# Patient Record
Sex: MALE | Race: WHITE | Employment: FULL TIME
[De-identification: names, ages, dates, MRNs, and addresses within clinical notes are randomized per-mention and may not be internally consistent; named-entity substitution may affect disease eponyms.]

---

## 2017-03-08 ENCOUNTER — OFFICE VISIT (OUTPATIENT)
Dept: NEUROLOGY | Facility: CLINIC | Age: 32
End: 2017-03-08

## 2017-03-08 VITALS
WEIGHT: 182.2 LBS | DIASTOLIC BLOOD PRESSURE: 74 MMHG | BODY MASS INDEX: 24.15 KG/M2 | SYSTOLIC BLOOD PRESSURE: 116 MMHG | HEIGHT: 73 IN | HEART RATE: 72 BPM

## 2017-03-08 DIAGNOSIS — I63.81 THALAMIC INFARCTION (HCC): Primary | ICD-10-CM

## 2017-03-08 PROCEDURE — 99213 OFFICE O/P EST LOW 20 MIN: CPT | Performed by: PSYCHIATRY & NEUROLOGY

## 2017-12-21 ENCOUNTER — OFFICE VISIT (OUTPATIENT)
Dept: NEUROLOGY | Age: 32
End: 2017-12-21
Payer: COMMERCIAL

## 2017-12-21 ENCOUNTER — TELEPHONE (OUTPATIENT)
Dept: NEUROLOGY | Age: 32
End: 2017-12-21

## 2017-12-21 VITALS
BODY MASS INDEX: 24.78 KG/M2 | DIASTOLIC BLOOD PRESSURE: 77 MMHG | SYSTOLIC BLOOD PRESSURE: 126 MMHG | WEIGHT: 187 LBS | HEART RATE: 80 BPM | HEIGHT: 73 IN

## 2017-12-21 DIAGNOSIS — I63.81 THALAMIC INFARCTION (HCC): Primary | ICD-10-CM

## 2017-12-21 PROCEDURE — 99213 OFFICE O/P EST LOW 20 MIN: CPT | Performed by: PSYCHIATRY & NEUROLOGY

## 2017-12-21 NOTE — LETTER
disorder despite the past thalamic infarction. REVIEW OF SYSTEMS    CONSTITUTIONAL Weight: absent, Appetite: absent, Fatigue: absent      HEENT Ears: normal, Eyes: normal, Visual disturbance: absent   RESPIRATORY Shortness of breath: absent, Cough: absent   CARDIOVASCULAR Chest pain: absent, Leg swelling: absent      GI Constipation: absent, Diarrhea: absent, Swallowing change: absent       Urinary frequency: absent, Urinary urgency: absent, Urinary incontinence: absent   MUSCULOSKELETAL Neck pain: absent, Back pain: absent, Stiffness: present, Muscle pain: absent, Joint pain: absent, Restless legs: present   DERMATOLOGIC Hair loss: absent, Skin changes: absent   NEUROLOGIC Memory loss: absent, Confusion: absent, Seizures: absent Trouble walking or imbalance: present, Dizziness: absent, Weakness: present, Numbness: present, Tremor: absent, Spasm: present, Speech difficulty: absent, Headache: present, Light sensitivity: absent   PSYCHIATRIC Anxiety: present, Hallucination: absent, Mood disorder: present   HEMATOLOGIC Abnormal bleeding: absent, Anemia: absent, Clotting disorder: absent, Lymph gland changes: absent                   Outpatient Prescriptions Marked as Taking for the 12/21/17 encounter (Office Visit) with Chilango Heller MD   Medication Sig Dispense Refill    aspirin 81 MG tablet Take 81 mg by mouth daily                                            PHYSICAL EXAMINATION                                              .                                                                                                     General Appearance:  Alert, cooperative, no signs of distress, appears stated age, well groomed and dressed, fit for age   Head:  Normocephalic, no signs of trauma   Eyes:  Conjunctiva/corneas clear; eyelids intact   Ears:  Normal external ear and canals   Nose: Nares normal, mucosa normal    Throat: Lips and tongue normal; teeth normal; gums normal Neck: Supple neck with intact flexion, extension and rotation; no muscle spasm;  trachea midline; no adenopathy; thyroid: not enlarged; no carotid pulse abnormality   Back:   Symmetric, no curvature, ROM adequate   Lungs:   Respirations unlabored   Chest Wall:  No deformity   Heart:  Regular rate and rhythm   Abdomen:   No masses   Extremities: Extremities normal, no cyanosis, no edema   Pulses: Symmetric over head and neck   Skin: Skin color, texture normal, no rashes, no lesions   Lymph nodes: Cervical, supraclavicular nodes normal                                         NEUROLOGIC EXAMINATION    MENTAL STATUS: Alert, oriented, intact memory, no confusion, normal speech, normal language, no hallucination or delusion   CRANIAL NERVES: II     -      Visual fields intact to confrontation  III,IV,VI -  EOMs full, no afferent defect, no                      HIRA, no ptosis  V     -     Normal facial sensation  VII    -     Normal facial symmetry  VIII   -     Intact hearing  IX,X -     Symmetrical palate  XI    -     Symmetrical shoulder shrug  XII   -     Midline tongue, no atrophy    MOTOR FUNCTION: Normal bulk, normal tone, normal power;  no involuntary movements, no tremor   SENSORY FUNCTION: Normal touch, vibration and pin aside from left medial 3 fingertips and toes   CEREBELLAR FUNCTION: Intact fine motor control over upper limbs   REFLEX FUNCTION: Asymmetric with left side preponderance   STATION and GAIT Normal station, normal gait including tandem but slight left hand posturing and unsteadiness with reverse tandem on heels               ASSESSMENT and  PLAN:      In summary, your patient, Wolf Khan appears about the same. He has no new lateralizing or localizing neurologic deficits but subtle residual features of his past right thalamic injury with left-sided hyperreflexia and a subtle primary sensory disturbance over the left fingers and toes. There is no need for additional neurologic investigation or a change in therapy. His treatment plan is appropriate. I will remain available for questions or any other issues that may arise and he will return for reevaluation on an annual basis, as long as he remains stable. If you have questions, please do not hesitate to call me. I look forward to following Mellissa Richardson along with you.     Sincerely,        Michael Bennett MD

## 2017-12-21 NOTE — COMMUNICATION BODY
HPI:        Your patient, Alireza Jerome returns for continuing neurologic management of his past right thalamic infarction. He still follows with a cardiologist who has been monitoring his condition and considers the pulmonic regurgitation a trivial problem but has a loop recorder in him and is monitoring heart rate for potential cardiac irregularities that may be the cause of his stroke injury. Plans are for continued monitoring up to 3 years. August 2017 was the 2 year ji with this device. He still has numbness in the left foot and hand and minimal left foot weakness which seems more apparent when first up in the morning, when exercising or fatigued at the end of the day. Also while his guitar playing has improved, he still notices that he can't keep time as well with the left hand and the left fourth and fifth fingers are not as sensitive as they once were. As you recall, laboratories were normal aside from a homocysteine which was borderline at 16.7 (NL 5.9-16) and protein S which was low at 37 (NL ). Hematology evaluation found no greater need for anticoagulation other than the aspirin antiplatelet therapy. He also is being treated with B12 oral supplementation for the elevated borderline homocysteine due to his vegetarian diet and lack of B12. There have been no new medical or surgical issues nor any recent trauma, infection/fever or intoxication to complicate matters and no recurrent symptoms of ischemic cerebrovascular disease. Notably he has had no pain disorder despite the past thalamic infarction.                                       REVIEW OF SYSTEMS    CONSTITUTIONAL Weight: absent, Appetite: absent, Fatigue: absent      HEENT Ears: normal, Eyes: normal, Visual disturbance: absent   RESPIRATORY Shortness of breath: absent, Cough: absent   CARDIOVASCULAR Chest pain: absent, Leg swelling: absent      GI Constipation: absent, Diarrhea: absent, Swallowing change: absent       Urinary frequency: absent, Urinary urgency: absent, Urinary incontinence: absent   MUSCULOSKELETAL Neck pain: absent, Back pain: absent, Stiffness: present, Muscle pain: absent, Joint pain: absent, Restless legs: present   DERMATOLOGIC Hair loss: absent, Skin changes: absent   NEUROLOGIC Memory loss: absent, Confusion: absent, Seizures: absent Trouble walking or imbalance: present, Dizziness: absent, Weakness: present, Numbness: present, Tremor: absent, Spasm: present, Speech difficulty: absent, Headache: present, Light sensitivity: absent   PSYCHIATRIC Anxiety: present, Hallucination: absent, Mood disorder: present   HEMATOLOGIC Abnormal bleeding: absent, Anemia: absent, Clotting disorder: absent, Lymph gland changes: absent                   Outpatient Prescriptions Marked as Taking for the 12/21/17 encounter (Office Visit) with Scott Perales MD   Medication Sig Dispense Refill    aspirin 81 MG tablet Take 81 mg by mouth daily                                            PHYSICAL EXAMINATION                                              .                                                                                                     General Appearance:  Alert, cooperative, no signs of distress, appears stated age, well groomed and dressed, fit for age   Head:  Normocephalic, no signs of trauma   Eyes:  Conjunctiva/corneas clear; eyelids intact   Ears:  Normal external ear and canals   Nose: Nares normal, mucosa normal    Throat: Lips and tongue normal; teeth normal; gums normal   Neck: Supple neck with intact flexion, extension and rotation; no muscle spasm;  trachea midline; no adenopathy; thyroid: not enlarged; no carotid pulse abnormality   Back:   Symmetric, no curvature, ROM adequate   Lungs:   Respirations unlabored   Chest Wall:  No deformity   Heart:  Regular rate and rhythm   Abdomen:   No masses   Extremities: Extremities normal, no cyanosis, no edema   Pulses: Symmetric over head and neck   Skin: Skin color, texture normal, no rashes, no lesions   Lymph nodes: Cervical, supraclavicular nodes normal                                         NEUROLOGIC EXAMINATION    MENTAL STATUS: Alert, oriented, intact memory, no confusion, normal speech, normal language, no hallucination or delusion   CRANIAL NERVES: II     -      Visual fields intact to confrontation  III,IV,VI -  EOMs full, no afferent defect, no                      HIRA, no ptosis  V     -     Normal facial sensation  VII    -     Normal facial symmetry  VIII   -     Intact hearing  IX,X -     Symmetrical palate  XI    -     Symmetrical shoulder shrug  XII   -     Midline tongue, no atrophy    MOTOR FUNCTION: Normal bulk, normal tone, normal power;  no involuntary movements, no tremor   SENSORY FUNCTION: Normal touch, vibration and pin aside from left medial 3 fingertips and toes   CEREBELLAR FUNCTION: Intact fine motor control over upper limbs   REFLEX FUNCTION: Asymmetric with left side preponderance   STATION and GAIT Normal station, normal gait including tandem but slight left hand posturing and unsteadiness with reverse tandem on heels               ASSESSMENT and  PLAN:      In summary, your patient, Rubi Khan appears about the same. He has no new lateralizing or localizing neurologic deficits but subtle residual features of his past right thalamic injury with left-sided hyperreflexia and a subtle primary sensory disturbance over the left fingers and toes. There is no need for additional neurologic investigation or a change in therapy. His treatment plan is appropriate. I will remain available for questions or any other issues that may arise and he will return for reevaluation on an annual basis, as long as he remains stable.

## 2018-01-18 ENCOUNTER — OFFICE VISIT (OUTPATIENT)
Dept: INTERNAL MEDICINE | Age: 33
End: 2018-01-18
Payer: COMMERCIAL

## 2018-01-18 ENCOUNTER — HOSPITAL ENCOUNTER (OUTPATIENT)
Age: 33
Discharge: HOME | End: 2018-01-18
Payer: COMMERCIAL

## 2018-01-18 VITALS
DIASTOLIC BLOOD PRESSURE: 75 MMHG | WEIGHT: 186 LBS | HEART RATE: 80 BPM | HEIGHT: 73 IN | OXYGEN SATURATION: 98 % | SYSTOLIC BLOOD PRESSURE: 131 MMHG | BODY MASS INDEX: 24.65 KG/M2

## 2018-01-18 DIAGNOSIS — Z00.00 WELL ADULT EXAM: ICD-10-CM

## 2018-01-18 DIAGNOSIS — Z00.00 WELL ADULT EXAM: Primary | ICD-10-CM

## 2018-01-18 LAB
ALBUMIN SERPL-MCNC: 4.9 G/DL (ref 3.5–5.2)
ALBUMIN/GLOBULIN RATIO: 2 (ref 1–2.5)
ALP BLD-CCNC: 66 U/L (ref 40–129)
ALT SERPL-CCNC: 25 U/L (ref 5–41)
ANION GAP SERPL CALCULATED.3IONS-SCNC: 12 MMOL/L (ref 9–17)
AST SERPL-CCNC: 23 U/L
BILIRUB SERPL-MCNC: 1.8 MG/DL (ref 0.3–1.2)
BUN BLDV-MCNC: 9 MG/DL (ref 6–20)
BUN/CREAT BLD: ABNORMAL (ref 9–20)
CALCIUM SERPL-MCNC: 9.9 MG/DL (ref 8.6–10.4)
CHLORIDE BLD-SCNC: 103 MMOL/L (ref 98–107)
CHOLESTEROL/HDL RATIO: 2.3
CHOLESTEROL: 158 MG/DL
CO2: 26 MMOL/L (ref 20–31)
CREAT SERPL-MCNC: 0.85 MG/DL (ref 0.7–1.2)
GFR AFRICAN AMERICAN: >60 ML/MIN
GFR NON-AFRICAN AMERICAN: >60 ML/MIN
GFR SERPL CREATININE-BSD FRML MDRD: ABNORMAL ML/MIN/{1.73_M2}
GFR SERPL CREATININE-BSD FRML MDRD: ABNORMAL ML/MIN/{1.73_M2}
GLUCOSE BLD-MCNC: 87 MG/DL (ref 70–99)
HDLC SERPL-MCNC: 68 MG/DL
LDL CHOLESTEROL: 73 MG/DL (ref 0–130)
POTASSIUM SERPL-SCNC: 4.7 MMOL/L (ref 3.7–5.3)
SODIUM BLD-SCNC: 141 MMOL/L (ref 135–144)
TOTAL PROTEIN: 7.4 G/DL (ref 6.4–8.3)
TRIGL SERPL-MCNC: 85 MG/DL
VLDLC SERPL CALC-MCNC: NORMAL MG/DL (ref 1–30)

## 2018-01-18 PROCEDURE — 80061 LIPID PANEL: CPT

## 2018-01-18 PROCEDURE — 99395 PREV VISIT EST AGE 18-39: CPT | Performed by: INTERNAL MEDICINE

## 2018-01-18 PROCEDURE — 80053 COMPREHEN METABOLIC PANEL: CPT

## 2018-01-18 PROCEDURE — 36415 COLL VENOUS BLD VENIPUNCTURE: CPT

## 2018-01-18 RX ORDER — M-VIT,TX,IRON,MINS/CALC/FOLIC 27MG-0.4MG
1 TABLET ORAL DAILY
COMMUNITY

## 2018-01-18 ASSESSMENT — ENCOUNTER SYMPTOMS
EYES NEGATIVE: 1
RESPIRATORY NEGATIVE: 1

## 2018-01-18 ASSESSMENT — PATIENT HEALTH QUESTIONNAIRE - PHQ9
SUM OF ALL RESPONSES TO PHQ9 QUESTIONS 1 & 2: 0
2. FEELING DOWN, DEPRESSED OR HOPELESS: 0
SUM OF ALL RESPONSES TO PHQ QUESTIONS 1-9: 0
1. LITTLE INTEREST OR PLEASURE IN DOING THINGS: 0

## 2018-01-18 NOTE — PROGRESS NOTES
[address] [facility] INTERNAL MEDICINE [place] [address] [address]  [address]  [address] [place] [address]  Dept: [phone]  Dept Fax: [phone]    Glenna Amos is a 29 y.o. 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    Past Medical History:   Diagnosis Date    Head injury     Unspecified cerebral artery occlusion with cerebral infarction        Past Surgical History:   Procedure Laterality Date    ANKLE SURGERY Right 2002    INSERTABLE CARDIAC MONITOR  08/04/2015       Family History   Problem Relation Age of Onset    High Cholesterol Father     Diabetes Father     Heart Disease Father     Hypertension Father     Cancer Maternal Grandfather      colon    Heart Disease Paternal Grandmother        Social History   Substance Use Topics    Smoking status: Former Smoker     Quit date: 1/1/2016    Smokeless tobacco: Never Used    Alcohol use 0.0 oz/week      Comment: occ. Current Outpatient Prescriptions   Medication Sig Dispense Refill    Multiple Vitamins-Minerals (THERAPEUTIC MULTIVITAMIN-MINERALS) tablet Take 1 tablet by mouth daily      aspirin 81 MG tablet Take 81 mg by mouth daily       No current facility-administered medications for this visit. No Known Allergies    Health Maintenance   Topic Date Due    Flu vaccine (1) 09/01/2017    DTaP/Tdap/Td vaccine (1 - Tdap) 07/17/2018 (Originally 9/6/2006)    HIV screen  07/17/2018 (Originally 9/6/2002)       Visit Information    Have you changed or started any medications since your last visit including any over-the-counter medicines, vitamins, or herbal medicines?  no   Are you having any side effects from any of your medications? -  no  Have you stopped taking any of your medications? Is so, why? -  no    Have you seen any other physician or provider since your last visit? Yes - Records Requested  Have you had any other diagnostic tests since your last visit? Yes - Records Requested  Have you been seen in the emergency room and/or had an admission to a hospital since we last saw you? No  Have you had your routine dental cleaning in the past 6 months? no    Have you activated your [employer] account? If not, what are your barriers? Yes     Patient Care Team:  Abbey Baker MD as PCP - General (Internal Medicine)  Kevin Paz MD as Consulting Physician (Neurology)  Marianela Duke MD as Consulting Physician (Hematology and Oncology)    Medical History Review  Past Medical, Family, and Social History reviewed and does contribute to the patient presenting condition    Health Maintenance   Topic Date Due    Flu vaccine (1) 09/01/2017    DTaP/Tdap/Td vaccine (1 - Tdap) 07/17/2018 (Originally 9/6/2006)    HIV screen  07/17/2018 (Originally 9/6/2002)       Controlled Substances Monitoring:      HPI:     Here for well visit. Doing well. Had a stroke year or two ago. Has done well. Follows with neurology. Neurologic Problem   The patient's primary symptoms include focal weakness. The current episode started more than 1 year ago. The neurological problem developed suddenly. The problem has been gradually worsening since onset. There was lower extremity and left-sided focality noted. Past treatments include aspirin (physical therapy). The treatment provided moderate relief. His past medical history is significant for a CVA. ROS:     Review of Systems   HENT: Negative. Eyes: Negative. Respiratory: Negative. Cardiovascular: Negative. Endocrine: Negative. Neurological: Positive for focal weakness. All other systems reviewed and are negative.       Objective:     Physical Exam   Constitutional: He is oriented to person, place, and time. He appears well-developed and well-nourished. HENT:   Head: Normocephalic and atraumatic. Neck: Neck supple. Cardiovascular: Normal rate and regular rhythm. Pulmonary/Chest: Effort normal and breath sounds normal.   Abdominal: Soft. Musculoskeletal: He exhibits no edema. Neurological: He is alert and oriented to person, place, and time. Reflex Scores:       Tricep reflexes are 2+ on the right side and 3+ on the left side. Bicep reflexes are 2+ on the right side and 3+ on the left side. Brachioradialis reflexes are 2+ on the right side and 3+ on the left side. Patellar reflexes are 2+ on the right side and 4+ on the left side. Skin: Skin is warm and dry. Psychiatric: He has a normal mood and affect. His behavior is normal.   Vitals reviewed. /75   Pulse 80   Ht 6' 1\" (1.854 m)   Wt 186 lb (84.4 kg)   SpO2 98%   BMI 24.54 kg/m²     Assessment:      1. Well adult exam  Comprehensive Metabolic Panel    Lipid Panel       Plan:       1. Semaj received counseling on the following healthy behaviors: medication adherence    2. Prior labs and health maintenance reviewed. 3.  Discussed use, benefit, and side effects of prescribed medications. Barriers to medication compliance addressed. All his questions were answered. Pt voiced understanding. Nuria Levi will continue current medications, diet and exercise. No orders of the defined types were placed in this encounter. Completed Refills               Requested Prescriptions      No prescriptions requested or ordered in this encounter       4. Patient given educational materials - see patient instructions    5. Was a self-tracking handout given in paper form or via [employer]? No  If yes, see orders or list here.       Orders Placed This Encounter   Procedures    Comprehensive Metabolic Panel     Standing Status:   Future     Standing Expiration Date:   1/18/2019    Lipid Panel     Standing

## 2018-04-11 PROBLEM — Z00.00 WELL ADULT EXAM: Status: RESOLVED | Noted: 2018-01-18 | Resolved: 2018-04-11

## 2018-04-16 ENCOUNTER — HOSPITAL ENCOUNTER (OUTPATIENT)
Dept: CARDIAC CATH/INVASIVE PROCEDURES | Age: 33
Discharge: HOME | End: 2018-04-16
Payer: COMMERCIAL

## 2018-04-16 VITALS
OXYGEN SATURATION: 99 % | WEIGHT: 182 LBS | DIASTOLIC BLOOD PRESSURE: 73 MMHG | HEART RATE: 64 BPM | BODY MASS INDEX: 24.12 KG/M2 | TEMPERATURE: 98.1 F | SYSTOLIC BLOOD PRESSURE: 129 MMHG | HEIGHT: 73 IN | RESPIRATION RATE: 16 BRPM

## 2018-04-16 PROCEDURE — 33284 HC RMVL IMPLANTABLE PT ACTIVATED CAR EVENT RECORDER: CPT | Performed by: INTERNAL MEDICINE

## 2018-04-16 PROCEDURE — 2500000003 HC RX 250 WO HCPCS

## 2018-04-16 PROCEDURE — 7100000011 HC PHASE II RECOVERY - ADDTL 15 MIN

## 2018-04-16 PROCEDURE — 7100000010 HC PHASE II RECOVERY - FIRST 15 MIN

## 2018-08-09 ENCOUNTER — OFFICE VISIT (OUTPATIENT)
Dept: NEUROLOGY | Age: 33
End: 2018-08-09
Payer: COMMERCIAL

## 2018-08-09 VITALS
HEART RATE: 75 BPM | HEIGHT: 73 IN | BODY MASS INDEX: 23.25 KG/M2 | WEIGHT: 175.4 LBS | DIASTOLIC BLOOD PRESSURE: 78 MMHG | SYSTOLIC BLOOD PRESSURE: 127 MMHG

## 2018-08-09 DIAGNOSIS — I63.81 ACUTE ISCHEMIC VERTEBROBASILAR ARTERY THALAMIC STROKE INVOLVING RIGHT-SIDED VESSEL (HCC): Primary | ICD-10-CM

## 2018-08-09 PROCEDURE — 99214 OFFICE O/P EST MOD 30 MIN: CPT | Performed by: PSYCHIATRY & NEUROLOGY

## 2019-03-26 ENCOUNTER — HOSPITAL ENCOUNTER (OUTPATIENT)
Age: 34
Discharge: HOME | End: 2019-03-26
Payer: COMMERCIAL

## 2019-03-26 ENCOUNTER — OFFICE VISIT (OUTPATIENT)
Dept: PRIMARY CARE CLINIC | Age: 34
End: 2019-03-26
Payer: COMMERCIAL

## 2019-03-26 VITALS
WEIGHT: 171 LBS | SYSTOLIC BLOOD PRESSURE: 135 MMHG | OXYGEN SATURATION: 100 % | TEMPERATURE: 97.2 F | HEART RATE: 79 BPM | HEIGHT: 73 IN | DIASTOLIC BLOOD PRESSURE: 76 MMHG | BODY MASS INDEX: 22.66 KG/M2

## 2019-03-26 DIAGNOSIS — Z00.00 ANNUAL PHYSICAL EXAM: Primary | ICD-10-CM

## 2019-03-26 DIAGNOSIS — Z00.00 ANNUAL PHYSICAL EXAM: ICD-10-CM

## 2019-03-26 LAB
CHOLESTEROL, FASTING: 174 MG/DL
CHOLESTEROL/HDL RATIO: 2.4
HDLC SERPL-MCNC: 74 MG/DL
LDL CHOLESTEROL: 79 MG/DL (ref 0–130)
TRIGLYCERIDE, FASTING: 106 MG/DL
VLDLC SERPL CALC-MCNC: NORMAL MG/DL (ref 1–30)

## 2019-03-26 PROCEDURE — 82947 ASSAY GLUCOSE BLOOD QUANT: CPT

## 2019-03-26 PROCEDURE — 80061 LIPID PANEL: CPT

## 2019-03-26 PROCEDURE — 99395 PREV VISIT EST AGE 18-39: CPT | Performed by: NURSE PRACTITIONER

## 2019-03-26 PROCEDURE — 36415 COLL VENOUS BLD VENIPUNCTURE: CPT

## 2019-03-26 SDOH — HEALTH STABILITY: MENTAL HEALTH: HOW OFTEN DO YOU HAVE A DRINK CONTAINING ALCOHOL?: 2-4 TIMES A MONTH

## 2019-03-26 ASSESSMENT — ENCOUNTER SYMPTOMS
BLOOD IN STOOL: 0
DIARRHEA: 0
ABDOMINAL PAIN: 0
VOMITING: 0
TROUBLE SWALLOWING: 0
SHORTNESS OF BREATH: 0
WHEEZING: 0

## 2019-03-26 ASSESSMENT — PATIENT HEALTH QUESTIONNAIRE - PHQ9
1. LITTLE INTEREST OR PLEASURE IN DOING THINGS: 0
SUM OF ALL RESPONSES TO PHQ QUESTIONS 1-9: 0
SUM OF ALL RESPONSES TO PHQ9 QUESTIONS 1 & 2: 0
2. FEELING DOWN, DEPRESSED OR HOPELESS: 0
SUM OF ALL RESPONSES TO PHQ QUESTIONS 1-9: 0

## 2019-03-29 DIAGNOSIS — Z00.00 ANNUAL PHYSICAL EXAM: ICD-10-CM

## 2019-03-29 LAB — GLUCOSE BLD-MCNC: 91 MG/DL (ref 70–99)
